# Patient Record
Sex: FEMALE | Race: WHITE | NOT HISPANIC OR LATINO | URBAN - METROPOLITAN AREA
[De-identification: names, ages, dates, MRNs, and addresses within clinical notes are randomized per-mention and may not be internally consistent; named-entity substitution may affect disease eponyms.]

---

## 2019-04-24 ASSESSMENT — ENCOUNTER SYMPTOMS
NAUSEA: 1
ABDOMINAL PAIN: 1
HOARSE VOICE: 0
CONSTIPATION: 0
SORE THROAT: 1
VOMITING: 0
NECK MASS: 0
HEARTBURN: 1
SINUS PAIN: 1
DIARRHEA: 0
BOWEL INCONTINENCE: 0
TROUBLE SWALLOWING: 0
RECTAL PAIN: 0
BLOOD IN STOOL: 0
TASTE DISTURBANCE: 0
SINUS CONGESTION: 1
JAUNDICE: 0
SMELL DISTURBANCE: 0
BLOATING: 0

## 2019-04-24 ASSESSMENT — ANXIETY QUESTIONNAIRES
7. FEELING AFRAID AS IF SOMETHING AWFUL MIGHT HAPPEN: NOT AT ALL
GAD7 TOTAL SCORE: 0
5. BEING SO RESTLESS THAT IT IS HARD TO SIT STILL: NOT AT ALL
4. TROUBLE RELAXING: NOT AT ALL
1. FEELING NERVOUS, ANXIOUS, OR ON EDGE: NOT AT ALL
2. NOT BEING ABLE TO STOP OR CONTROL WORRYING: NOT AT ALL
7. FEELING AFRAID AS IF SOMETHING AWFUL MIGHT HAPPEN: NOT AT ALL
3. WORRYING TOO MUCH ABOUT DIFFERENT THINGS: NOT AT ALL
GAD7 TOTAL SCORE: 0
6. BECOMING EASILY ANNOYED OR IRRITABLE: NOT AT ALL

## 2019-04-25 ASSESSMENT — ANXIETY QUESTIONNAIRES: GAD7 TOTAL SCORE: 0

## 2019-04-26 ENCOUNTER — OFFICE VISIT (OUTPATIENT)
Dept: OBGYN | Facility: CLINIC | Age: 24
End: 2019-04-26
Attending: ADVANCED PRACTICE MIDWIFE
Payer: COMMERCIAL

## 2019-04-26 VITALS
SYSTOLIC BLOOD PRESSURE: 131 MMHG | DIASTOLIC BLOOD PRESSURE: 73 MMHG | WEIGHT: 131.7 LBS | HEIGHT: 63 IN | BODY MASS INDEX: 23.34 KG/M2 | HEART RATE: 79 BPM

## 2019-04-26 DIAGNOSIS — Z30.41 ENCOUNTER FOR SURVEILLANCE OF CONTRACEPTIVE PILLS: ICD-10-CM

## 2019-04-26 DIAGNOSIS — Z01.419 ENCOUNTER FOR GYNECOLOGICAL EXAMINATION WITHOUT ABNORMAL FINDING: Primary | ICD-10-CM

## 2019-04-26 DIAGNOSIS — Z11.3 SCREENING EXAMINATION FOR VENEREAL DISEASE: ICD-10-CM

## 2019-04-26 PROCEDURE — G0145 SCR C/V CYTO,THINLAYER,RESCR: HCPCS | Performed by: ADVANCED PRACTICE MIDWIFE

## 2019-04-26 PROCEDURE — 87591 N.GONORRHOEAE DNA AMP PROB: CPT | Performed by: ADVANCED PRACTICE MIDWIFE

## 2019-04-26 PROCEDURE — 87491 CHLMYD TRACH DNA AMP PROBE: CPT | Performed by: ADVANCED PRACTICE MIDWIFE

## 2019-04-26 PROCEDURE — G0463 HOSPITAL OUTPT CLINIC VISIT: HCPCS | Mod: ZF

## 2019-04-26 RX ORDER — NORGESTIMATE AND ETHINYL ESTRADIOL 0.25-0.035
1 KIT ORAL DAILY
Qty: 84 TABLET | Refills: 3 | Status: SHIPPED | OUTPATIENT
Start: 2019-04-26 | End: 2020-03-25

## 2019-04-26 RX ORDER — NORGESTIMATE AND ETHINYL ESTRADIOL 0.25-0.035
KIT ORAL
COMMUNITY
Start: 2018-05-18 | End: 2022-01-20

## 2019-04-26 ASSESSMENT — ANXIETY QUESTIONNAIRES
1. FEELING NERVOUS, ANXIOUS, OR ON EDGE: NOT AT ALL
6. BECOMING EASILY ANNOYED OR IRRITABLE: NOT AT ALL
GAD7 TOTAL SCORE: 0
3. WORRYING TOO MUCH ABOUT DIFFERENT THINGS: NOT AT ALL
5. BEING SO RESTLESS THAT IT IS HARD TO SIT STILL: NOT AT ALL
2. NOT BEING ABLE TO STOP OR CONTROL WORRYING: NOT AT ALL
7. FEELING AFRAID AS IF SOMETHING AWFUL MIGHT HAPPEN: NOT AT ALL

## 2019-04-26 ASSESSMENT — MIFFLIN-ST. JEOR: SCORE: 1321.52

## 2019-04-26 ASSESSMENT — PATIENT HEALTH QUESTIONNAIRE - PHQ9: 5. POOR APPETITE OR OVEREATING: NOT AT ALL

## 2019-04-26 NOTE — LETTER
2019       RE: Selene Engle  [address]  [facility] [address]     Dear Colleague,    Thank you for referring your patient, Selene Engle, to the [facility] at [facility]. Please see a copy of my visit note below.    Subjective:  Selene Engle is a 24 year old female who presents today for initiation of care/annual preventative exam.  HPI:    - Feeling overall well, has no concerns. Has never had pap.    - Has been on oral contraceptive pills for several years, and overall very happy with them, has not considered an IUD in the past.  Desires refill prescription.   - Gets regular withdrawal bleeding which lasts 5 days and is lighter than periods prior to initiating birth control. She also uses condoms regularly with her boyfriend of 1.5 years. Has no plans to conceive in the near future.    - No concerns for STIs, denies vaginal discharge or discomfort, has been tested for gonorrhea/chlamydia in the past which was negative.  Agreeable to testing today.    Menstrual History:  OB History    Para Term  AB Living   0 0 0 0 0 0   SAB TAB Ectopic Multiple Live Births   0 0 0 0 0       Withdrawal bleeding is regular q 4 weeks, lasts 5 days, heaviest flow in days 1-3.  Dysmenorrhea none. Cyclic symptoms include  NONE. Additional symptoms include NONE  Social History:  Current contraception: oral contraceptives and condoms  Number of partners in last year: 1    Social History     Socioeconomic History     Marital status: Single     Spouse name: Not on file     Number of children: Not on file     Years of education: Not on file     Highest education level: Not on file   Occupational History     Not on file   Social Needs     Financial resource strain: Not on file     Food insecurity:     Worry: Not on file     Inability: Not on file     Transportation needs:     Medical: Not on file     Non-medical: Not on file   Tobacco Use     Smoking  status: Never Smoker     Smokeless tobacco: Never Used   Substance and Sexual Activity     Alcohol use: Yes     Comment: light use on weekends     Drug use: Never     Sexual activity: Yes     Partners: Male     Birth control/protection: Condom, Pill   Lifestyle     Physical activity:     Days per week: Not on file     Minutes per session: Not on file     Stress: Not on file   Relationships     Social connections:     Talks on phone: Not on file     Gets together: Not on file     Attends Buddhist service: Not on file     Active member of club or organization: Not on file     Attends meetings of clubs or organizations: Not on file     Relationship status: Not on file     Intimate partner violence:     Fear of current or ex partner: Not on file     Emotionally abused: Not on file     Physically abused: Not on file     Forced sexual activity: Not on file   Other Topics Concern     Not on file   Social History Narrative     Not on file     Past Screenings:  Health Maintenance   Topic Date Due     PREVENTIVE CARE VISIT  1996     CHLAMYDIA SCREENING  1996     DTAP/TDAP/TD IMMUNIZATION (1 - Tdap) 02/07/2003     HPV IMMUNIZATION (1 - Female 3-dose series) 02/07/2011     HIV SCREEN (SYSTEM ASSIGNED)  02/07/2014     PAP SCREENING Q3 YR (SYSTEM ASSIGNED)  02/07/2017     INFLUENZA VACCINE (1) 09/01/2018     ZOSTER IMMUNIZATION (1 of 2) 02/07/2046     PHQ-2  Completed     IPV IMMUNIZATION  Aged Out     MENINGITIS IMMUNIZATION  Aged Out     No results found for: PAP   History of abnormal Pap smear: No. Has never had pap.    Immunizations:    There is no immunization history on file for this patient.  History:  No Known Allergies  History reviewed. No pertinent family history.  History reviewed. No pertinent past medical history.  Past Surgical History:   Procedure Laterality Date     NO HISTORY OF SURGERY       ROS:  BREAST: NEGATIVE for masses, tenderness or discharge  CV: NEGATIVE for chest pain, palpitations   GI:  "NEGATIVE for nausea, abdominal pain, heartburn, or change in bowel habits  : NEGATIVE for frequency, dysuria, or hematuria   female: NEGATIVE for dysuria, Hx urinary tract infection, Hx STD's, irregular vaginal bleeding and vaginal discharge.  C: NEGATIVE for fever, chills  R: NEGATIVE for significant cough or SOB    Physical Exam:  /73 (BP Location: Left arm, Patient Position: Chair)   Pulse 79   Ht 1.6 m (5' 3\")   Wt 59.7 kg (131 lb 11.2 oz)   LMP 04/01/2019 (Exact Date)   Breastfeeding? No   BMI 23.33 kg/m     BMI: Body mass index is 23.33 kg/m .  Gen: Well appearing, well developed, NAD.  HEENT: Wearing glasses, good dentition, moist mucous membranes.  Neck: Normal mobile thyroid, no goiter.  Breast: No nipple retraction, or other overlying skin changes. No erythema or warmth. No palpable masses.  CV: RRR, no murmurs.  Resp: CTAB, good air entry bilaterally.  Abd: Normal bowel sounds, non-tender to palpation, no masses, no hepatomegaly.    PELVIC EXAM:  External Genitalia: WNL  Bartholin's/Urethral Meatus/[place]'s (BUS):  WNL/Negative  Bladder:  WNL  Vagina:  Normal mucosa and no discharge  Cervix:  healthy  Anus/Perineum: Normal    PAP smear obtained: YES  Gonorrhea and chlamydia screen obtained: YES      Assessment:  Annual gyn exam   Cervical cancer screening  Oral contraceptive surveillance    Plan:  Orders Placed This Encounter   Procedures     Obtaining, preparing and conveyance of cervical or vaginal smear to laboratory.     Pap imaged thin layer screen only - recommended age 21 - 24 years     - Additional teaching done at this visit included: self breast exam and birth control  - Rx sent for OCP refill  - Pap and GC/CT collected today  -  RTC in one year for annual exam or with concerns.    I, Leo Jimenez, am serving as a scribe to document services personally performed by CNM based on the provider's statements to me. - Leo Jimenez, MS3    The encounter was performed by me and scribed by " the SNM. The scribed note accurately reflects my personal services and decisions made by me. ELLEN Haines CNM    Again, thank you for allowing me to participate in the care of your patient.      Sincerely,    ELLEN Haines CNM

## 2019-04-26 NOTE — PROGRESS NOTES
Subjective:  Selene Engle is a 24 year old female who presents today for initiation of care/annual preventative exam.  HPI:    - Feeling overall well, has no concerns. Has never had pap.    - Has been on oral contraceptive pills for several years, and overall very happy with them, has not considered an IUD in the past.  Desires refill prescription.   - Gets regular withdrawal bleeding which lasts 5 days and is lighter than periods prior to initiating birth control. She also uses condoms regularly with her boyfriend of 1.5 years. Has no plans to conceive in the near future.    - No concerns for STIs, denies vaginal discharge or discomfort, has been tested for gonorrhea/chlamydia in the past which was negative.  Agreeable to testing today.    Menstrual History:  OB History    Para Term  AB Living   0 0 0 0 0 0   SAB TAB Ectopic Multiple Live Births   0 0 0 0 0       Withdrawal bleeding is regular q 4 weeks, lasts 5 days, heaviest flow in days 1-3.  Dysmenorrhea none. Cyclic symptoms include  NONE. Additional symptoms include NONE  Social History:  Current contraception: oral contraceptives and condoms  Number of partners in last year: 1    Social History     Socioeconomic History     Marital status: Single     Spouse name: Not on file     Number of children: Not on file     Years of education: Not on file     Highest education level: Not on file   Occupational History     Not on file   Social Needs     Financial resource strain: Not on file     Food insecurity:     Worry: Not on file     Inability: Not on file     Transportation needs:     Medical: Not on file     Non-medical: Not on file   Tobacco Use     Smoking status: Never Smoker     Smokeless tobacco: Never Used   Substance and Sexual Activity     Alcohol use: Yes     Comment: light use on weekends     Drug use: Never     Sexual activity: Yes     Partners: Male     Birth control/protection: Condom, Pill   Lifestyle     Physical activity:     Days  per week: Not on file     Minutes per session: Not on file     Stress: Not on file   Relationships     Social connections:     Talks on phone: Not on file     Gets together: Not on file     Attends Caodaism service: Not on file     Active member of club or organization: Not on file     Attends meetings of clubs or organizations: Not on file     Relationship status: Not on file     Intimate partner violence:     Fear of current or ex partner: Not on file     Emotionally abused: Not on file     Physically abused: Not on file     Forced sexual activity: Not on file   Other Topics Concern     Not on file   Social History Narrative     Not on file     Past Screenings:  Health Maintenance   Topic Date Due     PREVENTIVE CARE VISIT  1996     CHLAMYDIA SCREENING  1996     DTAP/TDAP/TD IMMUNIZATION (1 - Tdap) 02/07/2003     HPV IMMUNIZATION (1 - Female 3-dose series) 02/07/2011     HIV SCREEN (SYSTEM ASSIGNED)  02/07/2014     PAP SCREENING Q3 YR (SYSTEM ASSIGNED)  02/07/2017     INFLUENZA VACCINE (1) 09/01/2018     ZOSTER IMMUNIZATION (1 of 2) 02/07/2046     PHQ-2  Completed     IPV IMMUNIZATION  Aged Out     MENINGITIS IMMUNIZATION  Aged Out     No results found for: PAP   History of abnormal Pap smear: No. Has never had pap.    Immunizations:    There is no immunization history on file for this patient.  History:  No Known Allergies  History reviewed. No pertinent family history.  History reviewed. No pertinent past medical history.  Past Surgical History:   Procedure Laterality Date     NO HISTORY OF SURGERY       ROS:  BREAST: NEGATIVE for masses, tenderness or discharge  CV: NEGATIVE for chest pain, palpitations   GI: NEGATIVE for nausea, abdominal pain, heartburn, or change in bowel habits  : NEGATIVE for frequency, dysuria, or hematuria   female: NEGATIVE for dysuria, Hx urinary tract infection, Hx STD's, irregular vaginal bleeding and vaginal discharge.  C: NEGATIVE for fever, chills  R: NEGATIVE for  "significant cough or SOB    Physical Exam:  /73 (BP Location: Left arm, Patient Position: Chair)   Pulse 79   Ht 1.6 m (5' 3\")   Wt 59.7 kg (131 lb 11.2 oz)   LMP 04/01/2019 (Exact Date)   Breastfeeding? No   BMI 23.33 kg/m    BMI: Body mass index is 23.33 kg/m .  Gen: Well appearing, well developed, NAD.  HEENT: Wearing glasses, good dentition, moist mucous membranes.  Neck: Normal mobile thyroid, no goiter.  Breast: No nipple retraction, or other overlying skin changes. No erythema or warmth. No palpable masses.  CV: RRR, no murmurs.  Resp: CTAB, good air entry bilaterally.  Abd: Normal bowel sounds, non-tender to palpation, no masses, no hepatomegaly.    PELVIC EXAM:  External Genitalia: WNL  Bartholin's/Urethral Meatus/[place]'s (BUS):  WNL/Negative  Bladder:  WNL  Vagina:  Normal mucosa and no discharge  Cervix:  healthy  Anus/Perineum: Normal    PAP smear obtained: YES  Gonorrhea and chlamydia screen obtained: YES      Assessment:  Annual gyn exam   Cervical cancer screening  Oral contraceptive surveillance    Plan:  Orders Placed This Encounter   Procedures     Obtaining, preparing and conveyance of cervical or vaginal smear to laboratory.     Pap imaged thin layer screen only - recommended age 21 - 24 years     - Additional teaching done at this visit included: self breast exam and birth control  - Rx sent for OCP refill  - Pap and GC/CT collected today  -  RTC in one year for annual exam or with concerns.    I, Leo Jimenez, am serving as a scribe to document services personally performed by CNM based on the provider's statements to me. - Leo Jimenez, MS3    The encounter was performed by me and scribed by the SNM. The scribed note accurately reflects my personal services and decisions made by me. ELLEN Haines CNM  "

## 2019-04-26 NOTE — LETTER
Date:May 6, 2019      Patient was self referred, no letter generated. Do not send.        [facility] Information

## 2019-04-28 LAB
C TRACH DNA SPEC QL NAA+PROBE: NEGATIVE
N GONORRHOEA DNA SPEC QL NAA+PROBE: NEGATIVE
SPECIMEN SOURCE: NORMAL
SPECIMEN SOURCE: NORMAL

## 2019-04-30 LAB
COPATH REPORT: NORMAL
PAP: NORMAL

## 2020-03-11 ENCOUNTER — HEALTH MAINTENANCE LETTER (OUTPATIENT)
Age: 25
End: 2020-03-11

## 2020-03-24 ENCOUNTER — MYC MEDICAL ADVICE (OUTPATIENT)
Dept: OBGYN | Facility: CLINIC | Age: 25
End: 2020-03-24

## 2020-03-24 DIAGNOSIS — Z30.41 ENCOUNTER FOR SURVEILLANCE OF CONTRACEPTIVE PILLS: ICD-10-CM

## 2020-03-25 RX ORDER — NORGESTIMATE AND ETHINYL ESTRADIOL 0.25-0.035
1 KIT ORAL DAILY
Qty: 84 TABLET | Refills: 3 | Status: SHIPPED | OUTPATIENT
Start: 2020-03-25 | End: 2021-02-01

## 2021-01-03 ENCOUNTER — HEALTH MAINTENANCE LETTER (OUTPATIENT)
Age: 26
End: 2021-01-03

## 2021-01-23 ENCOUNTER — OFFICE VISIT (OUTPATIENT)
Dept: ORTHOPEDICS | Facility: CLINIC | Age: 26
End: 2021-01-23
Payer: COMMERCIAL

## 2021-01-23 VITALS — BODY MASS INDEX: 22.36 KG/M2 | WEIGHT: 131 LBS | HEIGHT: 64 IN

## 2021-01-23 DIAGNOSIS — M89.8X1 PERISCAPULAR PAIN: Primary | ICD-10-CM

## 2021-01-23 PROCEDURE — 99203 OFFICE O/P NEW LOW 30 MIN: CPT | Performed by: FAMILY MEDICINE

## 2021-01-23 ASSESSMENT — MIFFLIN-ST. JEOR: SCORE: 1329.21

## 2021-01-23 NOTE — PROGRESS NOTES
"      SPORTS & ORTHOPEDIC WALK-IN VISIT 1/23/2021    Primary Care Physician: Dr. Vega Ref-Primary, Physician    Here today with right posterior shoulder pain.  Began having some localized pain in the periscapular area before edwin after working and then sitting in a car for a long period of time.  And she went skiiing after edwin and had more pain and in a more larger area. Pain is the most when laying on back, when waking up it is super painful and stiff.  She does not have any popping clicking or catching.  Has not had any tingling or numbness.  Has not noted any weakness.    Reason for visit:     What part of your body is injured / painful?  right shoulder    What caused the injury /pain? No inciting event     How long ago did your injury occur or pain begin? 12/18/20    What are your most bothersome symptoms? Pain    How would you characterize your symptom?  aching and sharp    What makes your symptoms better? Ice    What makes your symptoms worse? Other: laying     Have you been previously seen for this problem? No    Medical History:    Any recent changes to your medical history? No    Any new medication prescribed since last visit? No    Have you had surgery on this body part before? No    Social History:    Occupation: Student     Handedness: Right    Exercise: 4-5 days/week    Review of Systems:    Do you have fever, chills, weight loss? No    Do you have any vision problems? No    Do you have any chest pain or edema? No    Do you have any shortness of breath or wheezing?  No    Do you have stomach problems? No    Do you have any numbness or focal weakness? No    Do you have diabetes? No    Do you have problems with bleeding or clotting? No    Do you have an rashes or other skin lesions? No         Past Medical History, Current Medications, and Allergies are reviewed in the electronic medical record as appropriate.       EXAM:Ht 1.626 m (5' 4\")   Wt 59.4 kg (131 lb)   BMI 22.49 kg/m  "     EXAM:  Alert, pleasant and conversational    Neck with full AROM, non-tender to midline cervical and upper thoracic spine palpation, negative Spurling's.  Elbow with FROM and non-tender to palpation      right shoulder:   Skin intact. No skin changes, deformity or atrophy    AROM:   Forward Flexion: 180    Abduction: 180    External rotation: ~70    Internal rotation: T7.   symmetric ROM compared to uninjured side  symmetric Scapular motion    Strength testing:   Abduction: 5/5,   External rotation: 5/5   Internal rotation 5/5     Deltoids 5/5, Biceps 5/5, Triceps 5/5,  5/5.    Palpation: negative TTP of the Acromioclavicular joint  negative TTP of Sternoclavicular joint  negative TTP of posterior glenoid  negative TTP of scapular borders  negative TTP of the bicipital tendon.     Special Tests: negative Johnson test  negative Neer's test.   negativeO'Tyson's test   negative Speed's test.    Neurovascularly intact bilateral upper extremities.      Imaging: no imaging this visit      Assessment: Patient is a 25 year old female with right periscapular pain.     Recommendations:   Reviewed imaging and assessment with the patient in detail  Provided with home exercises and recommended follow-up physical therapy  Discussed continuing activity as tolerated  Also discussed briefly modifying sleep positioning particularly extra support for the neck to see if this does not improve her nighttime pain  Also discussed the role of heat and ice as well as foam rolling and trigger point massage.  Follow up joe Ha MD

## 2021-01-23 NOTE — LETTER
1/23/2021         RE: Selene Engle  [address]  [facility] [address]        Dear Colleague,    Thank you for referring your patient, Selene Engle, to the [facility] [place]. Please see a copy of my visit note below.          SPORTS & ORTHOPEDIC WALK-IN VISIT 1/23/2021    Primary Care Physician: Dr. Vega Ref-Primary, Physician    Here today with right posterior shoulder pain.  Began having some localized pain in the periscapular area before edwin after working and then sitting in a car for a long period of time.  And she went skiiing after edwin and had more pain and in a more larger area. Pain is the most when laying on back, when waking up it is super painful and stiff.  She does not have any popping clicking or catching.  Has not had any tingling or numbness.  Has not noted any weakness.    Reason for visit:     What part of your body is injured / painful?  right shoulder    What caused the injury /pain? No inciting event     How long ago did your injury occur or pain begin? 12/18/20    What are your most bothersome symptoms? Pain    How would you characterize your symptom?  aching and sharp    What makes your symptoms better? Ice    What makes your symptoms worse? Other: laying     Have you been previously seen for this problem? No    Medical History:    Any recent changes to your medical history? No    Any new medication prescribed since last visit? No    Have you had surgery on this body part before? No    Social History:    Occupation: Student     Handedness: Right    Exercise: 4-5 days/week    Review of Systems:    Do you have fever, chills, weight loss? No    Do you have any vision problems? No    Do you have any chest pain or edema? No    Do you have any shortness of breath or wheezing?  No    Do you have stomach problems? No    Do you have any numbness or focal weakness? No    Do you have diabetes? No    Do you have problems with bleeding or clotting?  "No    Do you have an rashes or other skin lesions? No         Past Medical History, Current Medications, and Allergies are reviewed in the electronic medical record as appropriate.       EXAM:Ht 1.626 m (5' 4\")   Wt 59.4 kg (131 lb)   BMI 22.49 kg/m      EXAM:  Alert, pleasant and conversational    Neck with full AROM, non-tender to midline cervical and upper thoracic spine palpation, negative Spurling's.  Elbow with FROM and non-tender to palpation      right shoulder:   Skin intact. No skin changes, deformity or atrophy    AROM:   Forward Flexion: 180    Abduction: 180    External rotation: ~70    Internal rotation: T7.   symmetric ROM compared to uninjured side  symmetric Scapular motion    Strength testing:   Abduction: 5/5,   External rotation: 5/5   Internal rotation 5/5     Deltoids 5/5, Biceps 5/5, Triceps 5/5,  5/5.    Palpation: negative TTP of the Acromioclavicular joint  negative TTP of Sternoclavicular joint  negative TTP of posterior glenoid  negative TTP of scapular borders  negative TTP of the bicipital tendon.     Special Tests: negative Johnson test  negative Neer's test.   negativeO'Tyson's test   negative Speed's test.    Neurovascularly intact bilateral upper extremities.      Imaging: no imaging this visit      Assessment: Patient is a 25 year old female with right periscapular pain.     Recommendations:   Reviewed imaging and assessment with the patient in detail  Provided with home exercises and recommended follow-up physical therapy  Discussed continuing activity as tolerated  Also discussed briefly modifying sleep positioning particularly extra support for the neck to see if this does not improve her nighttime pain  Also discussed the role of heat and ice as well as foam rolling and trigger point massage.  Follow up joe Ha MD      "

## 2021-01-29 ENCOUNTER — MYC MEDICAL ADVICE (OUTPATIENT)
Dept: OBGYN | Facility: CLINIC | Age: 26
End: 2021-01-29

## 2021-01-29 DIAGNOSIS — Z30.41 ENCOUNTER FOR SURVEILLANCE OF CONTRACEPTIVE PILLS: ICD-10-CM

## 2021-02-01 RX ORDER — NORGESTIMATE AND ETHINYL ESTRADIOL 0.25-0.035
1 KIT ORAL DAILY
Qty: 84 TABLET | Refills: 3 | Status: SHIPPED | OUTPATIENT
Start: 2021-02-01 | End: 2022-01-20

## 2021-02-01 NOTE — TELEPHONE ENCOUNTER
Received refill request for OCP.  Patient is due for annual. Pap done in 2019.     Able to refill OCP per protocol. Reminder for annual sent through [employer].

## 2021-02-23 DIAGNOSIS — Z30.41 ENCOUNTER FOR SURVEILLANCE OF CONTRACEPTIVE PILLS: Primary | ICD-10-CM

## 2021-02-23 RX ORDER — NORGESTIMATE AND ETHINYL ESTRADIOL 0.25-0.035
KIT ORAL
Qty: 28 TABLET | Refills: 26 | Status: SHIPPED | OUTPATIENT
Start: 2021-02-23

## 2021-04-06 ENCOUNTER — IMMUNIZATION (OUTPATIENT)
Dept: NURSING | Facility: CLINIC | Age: 26
End: 2021-04-06
Payer: COMMERCIAL

## 2021-04-06 PROCEDURE — 0001A PR COVID VAC PFIZER DIL RECON 30 MCG/0.3 ML IM: CPT

## 2021-04-06 PROCEDURE — 91300 PR COVID VAC PFIZER DIL RECON 30 MCG/0.3 ML IM: CPT

## 2021-04-28 ENCOUNTER — IMMUNIZATION (OUTPATIENT)
Dept: NURSING | Facility: CLINIC | Age: 26
End: 2021-04-28
Attending: INTERNAL MEDICINE
Payer: COMMERCIAL

## 2021-04-28 PROCEDURE — 91300 PR COVID VAC PFIZER DIL RECON 30 MCG/0.3 ML IM: CPT

## 2021-04-28 PROCEDURE — 0002A PR COVID VAC PFIZER DIL RECON 30 MCG/0.3 ML IM: CPT

## 2022-01-20 ENCOUNTER — OFFICE VISIT (OUTPATIENT)
Dept: OBGYN | Facility: CLINIC | Age: 27
End: 2022-01-20
Payer: COMMERCIAL

## 2022-01-20 VITALS
SYSTOLIC BLOOD PRESSURE: 114 MMHG | HEIGHT: 63 IN | DIASTOLIC BLOOD PRESSURE: 72 MMHG | OXYGEN SATURATION: 97 % | BODY MASS INDEX: 28.17 KG/M2 | WEIGHT: 159 LBS | HEART RATE: 86 BPM

## 2022-01-20 DIAGNOSIS — Z12.4 SCREENING FOR MALIGNANT NEOPLASM OF CERVIX: ICD-10-CM

## 2022-01-20 DIAGNOSIS — Z30.09 COUNSELING FOR BIRTH CONTROL REGARDING INTRAUTERINE DEVICE (IUD): ICD-10-CM

## 2022-01-20 DIAGNOSIS — N94.6 DYSMENORRHEA: ICD-10-CM

## 2022-01-20 DIAGNOSIS — Z30.41 ORAL CONTRACEPTIVE PILL SURVEILLANCE: ICD-10-CM

## 2022-01-20 DIAGNOSIS — Z01.419 ENCOUNTER FOR GYNECOLOGICAL EXAMINATION WITHOUT ABNORMAL FINDING: Primary | ICD-10-CM

## 2022-01-20 PROCEDURE — 99212 OFFICE O/P EST SF 10 MIN: CPT | Mod: 25 | Performed by: OBSTETRICS & GYNECOLOGY

## 2022-01-20 PROCEDURE — G0145 SCR C/V CYTO,THINLAYER,RESCR: HCPCS | Performed by: OBSTETRICS & GYNECOLOGY

## 2022-01-20 PROCEDURE — 99385 PREV VISIT NEW AGE 18-39: CPT | Performed by: OBSTETRICS & GYNECOLOGY

## 2022-01-20 RX ORDER — NORGESTIMATE AND ETHINYL ESTRADIOL 0.25-0.035
1 KIT ORAL DAILY
Qty: 84 TABLET | Refills: 3 | Status: SHIPPED | OUTPATIENT
Start: 2022-01-20 | End: 2023-02-09

## 2022-01-20 ASSESSMENT — MIFFLIN-ST. JEOR: SCORE: 1435.35

## 2022-01-20 NOTE — PROGRESS NOTES
GYN CLINIC VISIT  2022  CC: annual exam    HPI:  Selene is a 26 year old  female who presents for annual exam.     Menses are regular q 28-30 days and normal lasting 5-7 days.  Menses flow: normal.  Patient's last menstrual period was 2022.. Using oral contraceptives and condoms for contraception.  She is not currently considering pregnancy. Menses are less heavy with OCPs.   Besides routine health maintenance,  she would like to discuss iud options.  Dysmenorrhea - 1 day (2nd day of flow) the cramps are really bad. Needs a heating pad and ibuprofen 400-600mg 1-2 times per day. Sometimes so bad unable to get out of bed. Taking OCPs.   Questions about IUD.   GYNECOLOGIC HISTORY:  Menarche:  Selene is sexually active with 1male partner(s) and is currently in monogamous relationship.    History sexually transmitted infections:No STD history  STI testing offered?  Declined  NGUYỄN exposure: Unknown  History of abnormal Pap smear: NO - age 21-29 PAP every 3 years recommended  Family history of breast CA: No  Family history of uterine/ovarian CA: No    Family history of colon CA: No    HEALTH MAINTENANCE:  Cholesterol: (No results found for: CHOL History of abnormal lipids: No  Mammo: na . History of abnormal Mammo: Not applicable.  Regular Self Breast Exams: Yes  Calcium/Vitamin D intake: source:  dairy Adequate? Yes  TSH: (No results found for: TSH )  Pap; (  Lab Results   Component Value Date    PAP NIL 2019    )    HISTORY:  OB History    Para Term  AB Living   0 0 0 0 0 0   SAB IAB Ectopic Multiple Live Births   0 0 0 0 0     No past medical history on file.     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  Lives with partner, feels safe  No tobacco, drinks 2/wk, no drug use  Weight lifting for exercise  Socioeconomic History     Marital status: Single     Spouse name: Not on file     Number of children: Not on file     Years of education: Not on file     Highest education level: Not on file   Occupational History     Not on file   Tobacco Use     Smoking status: Never Smoker     Smokeless tobacco: Never Used   Substance and Sexual Activity     Alcohol use: Yes     Comment: light use on weekends     Drug use: Never     Sexual activity: Yes     Partners: Male     Birth control/protection: Condom, Pill   Other Topics Concern     Not on file   Social History Narrative     Not on file     Social Determinants of Health     Financial Resource Strain: Not on file   Food Insecurity: Not on file   Transportation Needs: Not on file   Physical Activity: Not on file   Stress: Not on file   Social Connections: Not on file   Intimate Partner Violence: Not on file   Housing Stability: Not on file       Current Outpatient Medications:      FLORENTINO 0.25-35 MG-MCG tablet, TAKE 1 TABLET BY MOUTH EVERY DAY, Disp: 28 tablet, Rfl: 26   No Known Allergies    Past medical, surgical, social and family history were reviewed and updated in EPIC.    ROS:   C:     NEGATIVE for fever, chills, change in weight  I:       NEGATIVE for worrisome rashes, moles or lesions  E:     NEGATIVE for vision changes or irritation  E/M: NEGATIVE for ear, mouth and throat problems  R:     NEGATIVE for significant cough or SOB  CV:   NEGATIVE for chest pain, palpitations or peripheral edema  GI:     NEGATIVE for nausea, abdominal pain, heartburn, or change in bowel habits  :   NEGATIVE for frequency, dysuria, hematuria, vaginal discharge, or irregular bleeding  M:     NEGATIVE for significant  "arthralgias or myalgia  N:      NEGATIVE for weakness, dizziness or paresthesias  E:      NEGATIVE for temperature intolerance, skin/hair changes  P:      NEGATIVE for changes in mood or affect.    EXAM:  /72   Pulse 86   Ht 1.6 m (5' 3\")   Wt 72.1 kg (159 lb)   LMP 01/09/2022   SpO2 97%   BMI 28.17 kg/m     BMI: Body mass index is 28.17 kg/m .  Constitutional: healthy, alert and no distress  Head: Normocephalic. No masses, lesions, tenderness or abnormalities  Neck: Neck supple. Trachea midline. No adenopathy. Thyroid symmetric, normal size.   Cardiovascular: RRR.   Respiratory: clear bilaterally.  Breast: No nodularity, asymmetry or nipple discharge bilaterally.  Gastrointestinal: Abdomen soft, non-tender, non-distended. No masses, organomegaly.  :  Vulva:  No external lesions, normal female hair distribution, no inguinal adenopathy.    Urethra:  Midline, non-tender, well supported, no discharge  Vagina:  Moist, pink, no abnormal discharge, no lesions  Cervix: Long juanito speculum needed to view cervix. Appears nulliparous and normal, no lesions or abnormal discharge.  Uterus:  Normal size, anteverted , non-tender, freely mobile  Ovaries:  No masses appreciated, non-tender, mobile  Rectal Exam: deferred  Musculoskeletal: extremities normal  Skin: no suspicious lesions or rashes  Psychiatric: Affect appropriate, cooperative,mentation appears normal.     COUNSELING:   Reviewed preventive health counseling, as reflected in patient instructions       Regular exercise       Healthy diet/nutrition       Contraception       Safe sex practices/STD prevention   reports that she has never smoked. She has never used smokeless tobacco.    Body mass index is 28.17 kg/m .    FRAX Risk Assessment    ASSESSMENT:  26 year old female with satisfactory annual exam    1. Encounter for gynecological examination without abnormal finding      2. Oral contraceptive pill surveillance  - norgestimate-ethinyl estradiol " (ORTHO-CYCLEN, 28,) 0.25-35 MG-MCG tablet; Take 1 tablet by mouth daily  Dispense: 84 tablet; Refill: 3    3. Screening for malignant neoplasm of cervix  Follow up per ASCCP guidelines.  - Pap screen reflex to HPV if ASCUS - recommend age 25 - 29    4. Dysmenorrhea  Recommend ibuprofen 600mg q6h starting on first day of period (second day is the worse). Discussed continuous OCPs, discussed IUD.    5. Counseling for birth control regarding intrauterine device (IUD)  Discussed that intrauterine device is a form of long acting reversible contraception. Reviewed types of IUDs - copper and levonorgestrel containing - their respective mechanisms of action, bleeding profiles and duration. Discussed that copper IUD is effective immediately and can be used as emergency contraception. Discussed that with levonorgestrel IUD that ovarian cysts may occur but usually disappear.Discussed insertion process and what to expect immediately after. Reviewed contraindications to IUD including pregnancy, uterine anomaly, infection, known or suspected breast cancer or other progestin-sensitive cancer, liver disease, allergy. She was counseled on risks of infection, bleeding, uterine perforation, cervical laceration, expulsion. Reviewed that overall risk of pregnancy 2 in 1000, if she does get pregnant that there is an increased risk of ectopic pregnancy. Recommend patient come in during first 7 days of her menstrual cycle or use a reliable method of contraception for at least 2 weeks prior to insertion. Recommend patient take ibuprofen prior to insertion.     I recommend Mirena IUD given her h/o menorrhagia and current dysmenorrhea.     Nuria Chin MD

## 2022-01-24 LAB
BKR LAB AP GYN ADEQUACY: NORMAL
BKR LAB AP GYN INTERPRETATION: NORMAL
BKR LAB AP HPV REFLEX: NORMAL
BKR LAB AP LMP: NORMAL
BKR LAB AP PREVIOUS ABNORMAL: NORMAL
PATH REPORT.COMMENTS IMP SPEC: NORMAL
PATH REPORT.COMMENTS IMP SPEC: NORMAL
PATH REPORT.RELEVANT HX SPEC: NORMAL

## 2022-09-18 ENCOUNTER — HEALTH MAINTENANCE LETTER (OUTPATIENT)
Age: 27
End: 2022-09-18

## 2023-02-08 ENCOUNTER — MYC MEDICAL ADVICE (OUTPATIENT)
Dept: OBGYN | Facility: CLINIC | Age: 28
End: 2023-02-08

## 2023-02-08 DIAGNOSIS — Z30.41 ORAL CONTRACEPTIVE PILL SURVEILLANCE: ICD-10-CM

## 2023-02-08 DIAGNOSIS — Z30.41 ENCOUNTER FOR SURVEILLANCE OF CONTRACEPTIVE PILLS: ICD-10-CM

## 2023-02-08 RX ORDER — NORGESTIMATE AND ETHINYL ESTRADIOL 0.25-0.035
1 KIT ORAL DAILY
Qty: 28 TABLET | Refills: 26 | OUTPATIENT
Start: 2023-02-08

## 2023-02-09 RX ORDER — NORGESTIMATE AND ETHINYL ESTRADIOL 0.25-0.035
1 KIT ORAL DAILY
Qty: 84 TABLET | Refills: 0 | Status: SHIPPED | OUTPATIENT
Start: 2023-02-09 | End: 2023-04-11

## 2023-02-09 NOTE — TELEPHONE ENCOUNTER
Requested Prescriptions   Pending Prescriptions Disp Refills     norgestimate-ethinyl estradiol (ORTHO-CYCLEN (28)) 0.25-35 MG-MCG tablet 84 tablet 0     Sig: Take 1 tablet by mouth daily       There is no refill protocol information for this order        Last Written Prescription Date:  1/20/22  Last Fill Quantity: 84,  # refills: 3   Last office visit: 1/20/22 with prescribing provider:  Dr. Elsy Thomas message sent to schedule annual.    Future Office Visit:    None    Medication is being filled for 1 time refill only due to:  Patient needs to be seen because it has been more than one year since last visit.   Alessandra Sanon RN on 2/9/2023 at 9:36 AM

## 2023-02-13 RX ORDER — NORGESTIMATE AND ETHINYL ESTRADIOL 0.25-0.035
1 KIT ORAL DAILY
Qty: 28 TABLET | Refills: 26 | OUTPATIENT
Start: 2023-02-13

## 2023-04-08 NOTE — PATIENT INSTRUCTIONS
PREVENTIVE HEALTH RECOMMENDATIONS:   Most women need a yearly breast and pelvic exam.    A PAP screen, a test done DURING a pelvic exam, is NO longer recommended yearly.    March 2013, screening guidelines recommended by ACOG for PAP screen are:    1) First pap at age 21.    2) Pap every 3 years until age 30.    3) After age 30, pap every 3 years or Pap with HR HPV screen every 5 years until age 65.  4) Women do NOT need a vaginal Pap screen after a hysterectomy (surgical removal of the uterus) when they have not had cancer.    Exceptions:  1) Yearly pap if HIV+ or immunosuppressed secondary to organ transplant  2) LINDA II-III continue routine screening for 20 years.    I encourage you continue looking for opportunities to choose a healthy lifestyle:       * Choose to eat a heart healthy diet. Check out the FOOD PLATE guidelines at: http://www.choosemyplate.gov/ for helpful hints on weight and cholesterol management.  Balance your caloric intake with exercise to maintain a BMI in the 22 to 26 range. For bone health: Eat calcium-rich foods like yogurt, broccoli or take chewable calcium pills (500 to 600 mg) twice a day with food.       * Exercise for at least an average of 30 minutes a day, 5 days of the week. This will help you control your weight, release stress, and help prevent disease.      * Take a Vitamin D3 supplement daily fall through spring and during summer unless you [id number]' full body sun exposure to skin without sunscreen.      * DO wear sunscreen to prevent skin cancer after the first 15-30 minutes.      * Identify stressors in your life, find ways to release the stress, and, make time for yourself. PLEASE ask for help if mood changes last longer than two weeks.     * Limit alcohol to one drink per day.  No smoking.  Avoid second hand smoke. If you smoke, ask for help to stop.       *  If you are in a sexual relationship, talk with your partner about possible infection risks and take action to  protect yourself from exposure to a sexual infection.    Please request an infection screen for STIs (sexually transmitted infections) if you are less than age 26 OR believe that you may be at risk.     Get a flu shot each year. Get a tetanus shot every 10 years. EVERYONE needs a pertussis (Whooping cough) booster.    See your dentist twice a year for an exam and preventive care cleaning.     Consider the following screen tests:    1) cholesterol test every 5 years.     2) yearly mammogram after age 40 unless you have identified risks.    3) colonoscopy every 10 years after age 50 unless you have identified risks.    4) diabetes blood test screening if you are at risk for diabetes.      Additional information that you may also find helpful:  The Internet now gives us access to LOTS of information -- some of it helpful, research documented and also plenty of harmful, anecdotal information that may not pertain to your situtaion. Consider visiting the following websites for accurate health information:    www.vitamindcouncil.org/ : Info and ongoing research re Vitamin D    www.APERA BAGS.org : Up to date and easily searchable information on multiple topics.    www.medlineplus.gov : medication info, interactive tutorials, watch real surgeries online    www.cdc.gov : public health info, travel advisories, epidemics (H1N1)    www.axel/std.org: current research re diagnosis, treatment and prevention of sexually contacted infections.    www.health.Count includes the [facility].[place].us : [place] dept of heatl, public health issues in [place], N1N1    www.familydoctor.org : good info from the Academy of Family Physicians      Thank you for trusting us with your care!     If you need to contact us for questions about:  Symptoms, Scheduling & Medical Questions; Non-urgent (2-3 day response) William message, Urgent (needing response today) [phone] (if after 3:30pm next day response)   Prescriptions: Please call your Pharmacy   Billing: Manan [phone] or  [facility]:[phone]

## 2023-04-08 NOTE — PROGRESS NOTES
"Progress Note    SUBJECTIVE:  Selene Engle is an 28 year old, , who requests an Annual Preventive Exam.     Concerns today include: Curious about different types of birth control including IUD.      Diet: feels like she eats balanced diet.  Fruits and vegetables every meal.     Exercise: Walk everyday.  Weight lift 2-3 times per week.     Sleep: \"sleeps ok\".  Sometime having trouble falling asleep.  Sleep hygiene, \"not good about putting her phone away\", started reading before bed on dark mode.     Stressors: Currently in grad school; getting a PHD in plant biology. 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 Along with planning a wedding. Seeing a therapist for weekly therapy.     Sexual history: Fiance; been together 5 years; male partner; monogamous relationship.                             Denies concerns for STI's.     Menstrual cycle:  Experiences cramping the first couple days.  Tries to skip her period by not taking the placebo pills she is still getting a period. When period begins she stopped taking her pills and allows for a withdrawal bleed.   Cycle length: 21-22 days   Lasts: 5-7 days    Denies HTN, blood clots, Migraine with aura, and breast cancer both personal and family history.    Menstrual History:      2019     9:37 AM 2022     9:30 AM 2023     2:00 PM   Menstrual History   LAST MENSTRUAL PERIOD  2022   Menarche Age 12 years     Period Cycle (Days) 5     Method of Contraception Combined oral contraceptive     Period Pattern Regular     Menstrual Flow Moderate     Menstrual Control Other (Comment)     Dysmenorrhea Moderate     PMS Symptoms Cramping     Reviewed Today Yes     Comments diva cup         Last    Lab Results   Component Value Date    PAP NIL 2022     History of abnormal Pap smear: NO - age 21-29 PAP every 3 years recommended    Mammogram current: not applicable; will initiate screening at age 40  Last Mammogram: No past " results    Last Colonoscopy:not applicable - will initiate at age 45  No results found for this or any previous visit.      HISTORY:  FLORENTINO 0.25-35 MG-MCG tablet, TAKE 1 TABLET BY MOUTH EVERY DAY    No current facility-administered medications on file prior to visit.    Allergies   Allergen Reactions     No Known Allergies      Immunization History   Administered Date(s) Administered     COVID-19 Vaccine 12+ (Pfizer) 2021, 2021, 2021     COVID-19 Vaccine Bivalent Booster 12+ (Pfizer) 10/19/2022     DTAP (<7y) 2001     DTP-Hib 1996, 1996, 1996, 1997     Flu, Unspecified 2021     HPV Quadrivalent 2008, 2008, 2008     HepA, Unspecified 2010, 2011     HepB, Unspecified 1996, 1996, 1996     Influenza Vaccine >6 months (Alfuria,Fluzone) 10/08/2020, 2021, 2022     MMR 1997, 2001     Meningococcal (Menomune ) 2009     Meningococcal,unspecified 2009     Polio, Unspecified  1996, 1996, 1996, 1997     Poliovirus, inactivated (IPV) 2001     TDAP (Adacel,Boostrix) 2009, 2022     Typhoid IM 2022     Typhoid Oral 2016     Varicella 1999, 2011     Yellow Fever 2016       OB History    Para Term  AB Living   0 0 0 0 0 0   SAB IAB Ectopic Multiple Live Births   0 0 0 0 0     History reviewed. No pertinent past medical history.  Past Surgical History:   Procedure Laterality Date     NO HISTORY OF SURGERY       Family History   Problem Relation Age of Onset     No Known Problems Mother      No Known Problems Father      Liver Disease Maternal Grandmother      No Known Problems Maternal Grandfather      No Known Problems Paternal Grandmother      Lupus Paternal Grandfather      No Known Problems Brother      No Known Problems Sister      Social History     Socioeconomic History     Marital status: Single   Tobacco Use  "    Smoking status: Never     Smokeless tobacco: Never   Substance and Sexual Activity     Alcohol use: Yes     Comment: light use on weekends     Drug use: Never     Sexual activity: Yes     Partners: Male     Birth control/protection: Condom, Pill       ROS   ROS: 10 point ROS neg other than the symptoms noted above in the HPI.        4/24/2019    10:26 PM 4/26/2019     9:37 AM 4/11/2023     2:42 PM   KYLEE-7 SCORE   Total Score 0 (minimal anxiety)     Total Score 0 0 5     EXAM:  Blood pressure 129/83, pulse 90, height 1.6 m (5' 3\"), weight 79.3 kg (174 lb 12.8 oz), last menstrual period 04/04/2023, not currently breastfeeding. Body mass index is 30.96 kg/m .  General - pleasant female in no acute distress.  Skin - no suspicious lesions or rashes  EENT-  PERRLA, euthyroid with out palpable nodules  Neck - supple without lymphadenopathy.  Lungs - clear to auscultation bilaterally.  Heart - regular rate and rhythm without murmur.  Musculoskeletal - no gross deformities.  Neurological - normal strength, sensation, and mental status.    Breast Exam:  Breast:Deferred     Pelvic Exam: Deferred       ASSESSMENT:  Encounter Diagnoses   Name Primary?     Visit for preventive health examination Yes     Encounter for gynecological examination without abnormal finding      Oral contraceptive pill surveillance         PLAN:     Orders Placed This Encounter   Medications     norgestimate-ethinyl estradiol (ORTHO-CYCLEN, 28,) 0.25-35 MG-MCG tablet     Sig: Take 1 tablet by mouth daily     Dispense:  84 tablet     Refill:  4     - Education provided regarding IUD; placement, side effects, risks, benefits  -Pamphlets regarding the Chhaya, Kyleena, and Mirena given.   -Patient can schedule IUD placement at anytime.  Continue taking birth control until day of placement.  Encouraged to take 600 mg ibuprofen within 1 hour of IUD insertion.   -Discussed continuing to take her current SUSAN even when if withdrawal bleed occurs, over time " may help prevent cyclic bleeding, but might experience more days of bleeding initially   -Renewed prescription for current Ortho-Cyclen for 1 year.   - Reviewed needed screenings for this visit: None    Additional teaching done at this visit regarding self breast exam, exercise, birth control, mental health and weight/diet.    Return to clinic in one year.  Follow-up as needed.    I, Tea Gotti, am serving as a scribe; to document services personally performed by  Zaida Diallo CNM based on data collection and the provider's statements to me.     PINO Stubbs    The encounter was performed by me and scribed by the SNM. The scribed note accurately reflects my personal services and decisions made by me.     ELLEN Anton CNM

## 2023-04-11 ENCOUNTER — OFFICE VISIT (OUTPATIENT)
Dept: OBGYN | Facility: CLINIC | Age: 28
End: 2023-04-11
Attending: MIDWIFE
Payer: COMMERCIAL

## 2023-04-11 VITALS
HEART RATE: 90 BPM | DIASTOLIC BLOOD PRESSURE: 83 MMHG | HEIGHT: 63 IN | SYSTOLIC BLOOD PRESSURE: 129 MMHG | WEIGHT: 174.8 LBS | BODY MASS INDEX: 30.97 KG/M2

## 2023-04-11 DIAGNOSIS — Z30.41 ORAL CONTRACEPTIVE PILL SURVEILLANCE: ICD-10-CM

## 2023-04-11 DIAGNOSIS — Z01.419 ENCOUNTER FOR GYNECOLOGICAL EXAMINATION WITHOUT ABNORMAL FINDING: ICD-10-CM

## 2023-04-11 DIAGNOSIS — Z00.00 VISIT FOR PREVENTIVE HEALTH EXAMINATION: Primary | ICD-10-CM

## 2023-04-11 PROCEDURE — 99213 OFFICE O/P EST LOW 20 MIN: CPT | Performed by: MIDWIFE

## 2023-04-11 PROCEDURE — 99385 PREV VISIT NEW AGE 18-39: CPT | Performed by: MIDWIFE

## 2023-04-11 RX ORDER — NORGESTIMATE AND ETHINYL ESTRADIOL 0.25-0.035
1 KIT ORAL DAILY
Qty: 84 TABLET | Refills: 4 | Status: SHIPPED | OUTPATIENT
Start: 2023-04-11 | End: 2024-06-10

## 2023-04-11 ASSESSMENT — ANXIETY QUESTIONNAIRES
GAD7 TOTAL SCORE: 5
1. FEELING NERVOUS, ANXIOUS, OR ON EDGE: SEVERAL DAYS
GAD7 TOTAL SCORE: 5
3. WORRYING TOO MUCH ABOUT DIFFERENT THINGS: SEVERAL DAYS
7. FEELING AFRAID AS IF SOMETHING AWFUL MIGHT HAPPEN: NOT AT ALL
2. NOT BEING ABLE TO STOP OR CONTROL WORRYING: SEVERAL DAYS
5. BEING SO RESTLESS THAT IT IS HARD TO SIT STILL: NOT AT ALL
6. BECOMING EASILY ANNOYED OR IRRITABLE: SEVERAL DAYS

## 2023-04-11 ASSESSMENT — PATIENT HEALTH QUESTIONNAIRE - PHQ9
SUM OF ALL RESPONSES TO PHQ QUESTIONS 1-9: 6
5. POOR APPETITE OR OVEREATING: SEVERAL DAYS

## 2023-04-11 NOTE — LETTER
"2023       RE: Selene Engle  [address]  [address]  [facility] [address]     Dear Colleague,    Thank you for referring your patient, Selene Engle, to the [facility] [place] [facility]. Please see a copy of my visit note below.    Progress Note    SUBJECTIVE:  Selene Engle is an 28 year old, , who requests an Annual Preventive Exam.     Concerns today include: Curious about different types of birth control including IUD.      Diet: feels like she eats balanced diet.  Fruits and vegetables every meal.     Exercise: Walk everyday.  Weight lift 2-3 times per week.     Sleep: \"sleeps ok\".  Sometime having trouble falling asleep.  Sleep hygiene, \"not good about putting her phone away\", started reading before bed on dark mode.     Stressors: Currently in grad school; getting a PHD in plant biology. 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 Along with planning a wedding. Seeing a therapist for weekly therapy.     Sexual history: Fiance; been together 5 years; male partner; monogamous relationship.                             Denies concerns for STI's.     Menstrual cycle:  Experiences cramping the first couple days.  Tries to skip her period by not taking the placebo pills she is still getting a period. When period begins she stopped taking her pills and allows for a withdrawal bleed.   Cycle length: 21-22 days   Lasts: 5-7 days    Denies HTN, blood clots, Migraine with aura, and breast cancer both personal and family history.    Menstrual History:      2019     9:37 AM 2022     9:30 AM 2023     2:00 PM   Menstrual History   LAST MENSTRUAL PERIOD  2022   Menarche Age 12 years     Period Cycle (Days) 5     Method of Contraception Combined oral contraceptive     Period Pattern Regular     Menstrual Flow Moderate     Menstrual Control Other (Comment)     Dysmenorrhea Moderate     PMS " Symptoms Cramping     Reviewed Today Yes     Comments diva cup         Last    Lab Results   Component Value Date    PAP NIL 2022     History of abnormal Pap smear: NO - age 21-29 PAP every 3 years recommended    Mammogram current: not applicable; will initiate screening at age 40  Last Mammogram: No past results    Last Colonoscopy:not applicable - will initiate at age 46  No results found for this or any previous visit.      HISTORY:  FLORENTINO 0.25-35 MG-MCG tablet, TAKE 1 TABLET BY MOUTH EVERY DAY    No current facility-administered medications on file prior to visit.    Allergies   Allergen Reactions    No Known Allergies      Immunization History   Administered Date(s) Administered    COVID-19 Vaccine 12+ (Pfizer) 2021, 2021, 2021    COVID-19 Vaccine Bivalent Booster 12+ (Pfizer) 10/19/2022    DTAP (<7y) 2001    DTP-Hib 1996, 1996, 1996, 1997    Flu, Unspecified 2021    HPV Quadrivalent 2008, 2008, 2008    HepA, Unspecified 2010, 2011    HepB, Unspecified 1996, 1996, 1996    Influenza Vaccine >6 months (Alfuria,Fluzone) 10/08/2020, 2021, 2022    MMR 1997, 2001    Meningococcal (Menomune ) 2009    Meningococcal,unspecified 2009    Polio, Unspecified  1996, 1996, 1996, 1997    Poliovirus, inactivated (IPV) 2001    TDAP (Adacel,Boostrix) 2009, 2022    Typhoid IM 2022    Typhoid Oral 2016    Varicella 1999, 2011    Yellow Fever 2016       OB History    Para Term  AB Living   0 0 0 0 0 0   SAB IAB Ectopic Multiple Live Births   0 0 0 0 0     History reviewed. No pertinent past medical history.  Past Surgical History:   Procedure Laterality Date    NO HISTORY OF SURGERY       Family History   Problem Relation Age of Onset    No Known Problems Mother     No Known Problems Father     Liver Disease  "Maternal Grandmother     No Known Problems Maternal Grandfather     No Known Problems Paternal Grandmother     Lupus Paternal Grandfather     No Known Problems Brother     No Known Problems Sister      Social History     Socioeconomic History    Marital status: Single   Tobacco Use    Smoking status: Never    Smokeless tobacco: Never   Substance and Sexual Activity    Alcohol use: Yes     Comment: light use on weekends    Drug use: Never    Sexual activity: Yes     Partners: Male     Birth control/protection: Condom, Pill       ROS   ROS: 10 point ROS neg other than the symptoms noted above in the HPI.        4/24/2019    10:26 PM 4/26/2019     9:37 AM 4/11/2023     2:42 PM   KYLEE-7 SCORE   Total Score 0 (minimal anxiety)     Total Score 0 0 5     EXAM:  Blood pressure 129/83, pulse 90, height 1.6 m (5' 3\"), weight 79.3 kg (174 lb 12.8 oz), last menstrual period 04/04/2023, not currently breastfeeding. Body mass index is 30.96 kg/m .  General - pleasant female in no acute distress.  Skin - no suspicious lesions or rashes  EENT-  PERRLA, euthyroid with out palpable nodules  Neck - supple without lymphadenopathy.  Lungs - clear to auscultation bilaterally.  Heart - regular rate and rhythm without murmur.  Musculoskeletal - no gross deformities.  Neurological - normal strength, sensation, and mental status.    Breast Exam:  Breast:Deferred     Pelvic Exam: Deferred       ASSESSMENT:  Encounter Diagnoses   Name Primary?    Visit for preventive health examination Yes    Encounter for gynecological examination without abnormal finding     Oral contraceptive pill surveillance         PLAN:     Orders Placed This Encounter   Medications    norgestimate-ethinyl estradiol (ORTHO-CYCLEN, 28,) 0.25-35 MG-MCG tablet     Sig: Take 1 tablet by mouth daily     Dispense:  84 tablet     Refill:  4     - Education provided regarding IUD; placement, side effects, risks, benefits  -Pamphlets regarding the Chhaya, Kyleena, and Mirena " given.   -Patient can schedule IUD placement at anytime.  Continue taking birth control until day of placement.  Encouraged to take 600 mg ibuprofen within 1 hour of IUD insertion.   -Discussed continuing to take her current SUSAN even when if withdrawal bleed occurs, over time may help prevent cyclic bleeding, but might experience more days of bleeding initially   -Renewed prescription for current Ortho-Cyclen for 1 year.   - Reviewed needed screenings for this visit: None    Additional teaching done at this visit regarding self breast exam, exercise, birth control, mental health and weight/diet.    Return to clinic in one year.  Follow-up as needed.    ITea, am serving as a scribe; to document services personally performed by  Zaida Diallo CNM based on data collection and the provider's statements to me.     PINO Stubbs    The encounter was performed by me and scribed by the SNM. The scribed note accurately reflects my personal services and decisions made by me.     ELLEN Anton CNM

## 2024-06-10 DIAGNOSIS — Z30.41 ORAL CONTRACEPTIVE PILL SURVEILLANCE: ICD-10-CM

## 2024-06-10 RX ORDER — NORGESTIMATE AND ETHINYL ESTRADIOL 0.25-0.035
1 KIT ORAL DAILY
Qty: 84 TABLET | Refills: 3 | Status: SHIPPED | OUTPATIENT
Start: 2024-06-10

## 2024-06-10 NOTE — TELEPHONE ENCOUNTER
Received request for Ortho-Cyclen. Pt last seen 4/11/23 See RN contraceptive protocol below.    For women meeting the following criteria, hormonal contraceptives may be refilled annually for up to 3 years:  -Age between 15-40? y  -Non-smoker? y  -Up-to-date on pap/HPV? Y, last done 1/20/22  -No major medical comorbidities? y  -No history of migraine with aura? Y    Refilled for the year per protocol.

## 2024-07-14 ENCOUNTER — HEALTH MAINTENANCE LETTER (OUTPATIENT)
Age: 29
End: 2024-07-14

## 2024-10-30 ENCOUNTER — NURSE TRIAGE (OUTPATIENT)
Dept: NURSING | Facility: CLINIC | Age: 29
End: 2024-10-30
Payer: COMMERCIAL

## 2024-10-31 NOTE — TELEPHONE ENCOUNTER
Nurse Triage SBAR    Is this a 2nd Level Triage? NO    Situation: diarrhea on abx    Background:  Patient reports starting amoxicillin on Monday for a sinus infection and has developed severe diarrhea today.  Notes that she has had 7-8 watery stools today. Denies abdominal pain or fever.  Reports she is well hydrated and is able to eat and drink normally.      Assessment: severe diarrhea 7-8 watery stools in last 24 hours, on abx X 3 days    Protocol Recommended Disposition:   SEE HCP (OR PCP TRIAGE) WITHIN 4 HOURS    Recommendation: Advised patient to be seen within 4 hours by HCP.  Reviewed concerning symptoms and when to call back.        Does the patient meet one of the following criteria for ADS visit consideration? No    Lucy Ren RN on 10/30/2024 at 8:42 PM    Reason for Disposition   SEVERE diarrhea (e.g., 7 or more times / day more than normal)    Additional Information   Negative: Shock suspected (e.g., cold/pale/clammy skin, too weak to stand, low BP, rapid pulse)   Negative: Difficult to awaken or acting confused (e.g., disoriented, slurred speech)   Negative: Sounds like a life-threatening emergency to the triager   Negative: Vomiting also present and worse than the diarrhea   Negative: SEVERE abdominal pain (e.g., excruciating)   Negative: [1] Blood in the stool AND [2] moderate or large amount of blood (e.g., any blood clots, passing blood without stool, toilet water turns red)   Negative: Black or tarry bowel movements  (Exception: Chronic-unchanged black-grey BMs AND is taking iron pills or Pepto-Bismol.)   Negative: [1] Drinking very little AND [2] dehydration suspected (e.g., no urine > 12 hours, very dry mouth, very lightheaded)   Negative: Patient sounds very sick or weak to the triager    Protocols used: Diarrhea on Antibiotics-A-

## 2025-01-12 ENCOUNTER — OFFICE VISIT (OUTPATIENT)
Dept: URGENT CARE | Facility: URGENT CARE | Age: 30
End: 2025-01-12
Payer: COMMERCIAL

## 2025-01-12 VITALS
DIASTOLIC BLOOD PRESSURE: 84 MMHG | TEMPERATURE: 97.9 F | SYSTOLIC BLOOD PRESSURE: 123 MMHG | HEART RATE: 62 BPM | RESPIRATION RATE: 14 BRPM | OXYGEN SATURATION: 96 %

## 2025-01-12 DIAGNOSIS — S61.211A LACERATION OF LEFT INDEX FINGER WITHOUT FOREIGN BODY WITHOUT DAMAGE TO NAIL, INITIAL ENCOUNTER: Primary | ICD-10-CM

## 2025-01-12 PROCEDURE — 12001 RPR S/N/AX/GEN/TRNK 2.5CM/<: CPT | Performed by: NURSE PRACTITIONER

## 2025-01-12 RX ORDER — FEXOFENADINE HCL 60 MG/1
60 TABLET, FILM COATED ORAL 2 TIMES DAILY
COMMUNITY

## 2025-01-13 NOTE — PATIENT INSTRUCTIONS
Keep skin clean and dry- shower and pat dry    Watch closely for worsening symptoms of infection including fever, chills, redness, swelling, pain, purulent discharge and follow-up right away if develops.

## 2025-01-13 NOTE — PROGRESS NOTES
Chief Complaint:     Chief Complaint   Patient presents with    Urgent Care    Laceration     Patient presents with a laceration to her left index finger from a kitchen knife.         HPI: Selene Engle is an 29 year old female that presents to clinic after cutting self on the left index finger with a kitchen knife about 30 minutes ago. She reports numbness of the finger but states she thinks it is due to pain. She denies dysfunction of the finger. Patient denies any loss of strength to the finger.  Patient is up to date on tetanus, last 2022.     Problem list, Medication list, Allergies, and Medical history reviewed in [facility] and updated as appropriate.    ROS:  Review of systems negative except for noted above      Vitals reviewed by Chiquita Moe NP  /84 (BP Location: Right arm)   Pulse 62   Temp 97.9  F (36.6  C) (Temporal)   Resp 14   SpO2 96%     Physical Exam:    Physical Exam  Constitutional:       General: She is not in acute distress.     Appearance: She is not toxic-appearing or diaphoretic.   Cardiovascular:      Pulses: Normal pulses.   Musculoskeletal:      Left hand: Tenderness present. No swelling. Normal range of motion. Normal sensation.   Skin:     General: Skin is warm.      Capillary Refill: Capillary refill takes less than 2 seconds.   Neurological:      Mental Status: She is alert.      Sensory: No sensory deficit.         Laceration size: 1 cm  Tendon function intact: yes  Sensation to light touch intact: yes  Pulses intact: yes    Medical Decision Making:  Laceration no evidence of neurovascular injury. The wound will be closed using sutures.     Assessment:     1. Laceration of left index finger without foreign body without damage to nail, initial encounter         Plan:    Patient declines further evaluation in emergency room for finger numbness.     Imaging of the injured area for foreign body or fracture was not indicated    Wound closed per procedure note below. Tetanus is  up to date    Wound was irrigated with sterile water and cleansed surgiscrub along with iodine swabs.  Vaseline ointment and sterile dressing applied in clinic along with finger splint since over joint     Discussed home wound care. Return to  with increased swelling, pain, redness, pus or fevers.  Follow up for Suture removal in 12 days.       Patient verbalized understanding and agreed with this plan. AVS reviewed with patient. Patient was discharged in stable condition.       Procedure Note - Wound Repair:     Anesthesia was obtained with 1% plain lidocaine via Local.  The wound, located on the left index finger, measured 1 cm and was clean wound edges, no foreign bodies.  The level of complexity was: simple .  The neurovascular exam was intact.  It was irrigated with sterile water and cleansed with surgiscrub and iodine swabs and explored.  The wound was closed using three 5-0 Ethylon interrupted. Patient became pale and dizzy during procedure. Applied ice and gave juice and water to drink and symptoms resolved. Patient discharged in stable condition.     Chiquita Moe NP 7:22 PM

## 2025-05-15 DIAGNOSIS — Z30.41 ORAL CONTRACEPTIVE PILL SURVEILLANCE: ICD-10-CM

## 2025-05-21 ENCOUNTER — MYC REFILL (OUTPATIENT)
Dept: OBGYN | Facility: CLINIC | Age: 30
End: 2025-05-21
Payer: COMMERCIAL

## 2025-05-21 DIAGNOSIS — Z30.41 ENCOUNTER FOR SURVEILLANCE OF CONTRACEPTIVE PILLS: ICD-10-CM

## 2025-05-21 RX ORDER — NORGESTIMATE AND ETHINYL ESTRADIOL 0.25-0.035
1 KIT ORAL DAILY
Qty: 84 TABLET | Refills: 3 | Status: SHIPPED | OUTPATIENT
Start: 2025-05-21

## 2025-05-21 RX ORDER — NORGESTIMATE AND ETHINYL ESTRADIOL 0.25-0.035
1 KIT ORAL DAILY
Qty: 28 TABLET | Refills: 26 | OUTPATIENT
Start: 2025-05-21

## 2025-05-21 NOTE — TELEPHONE ENCOUNTER
"Received request for Jaime Pt last seen in 2022.       Per visit note \"PLAN:           Orders Placed This Encounter   Medications    norgestimate-ethinyl estradiol (ORTHO-CYCLEN, 28,) 0.25-35 MG-MCG tablet       Sig: Take 1 tablet by mouth daily       Dispense:  84 tablet       Refill:  4      - Education provided regarding IUD; placement, side effects, risks, benefits  -Pamphlets regarding the Chhaya, Kyleena, and Mirena given.   -Patient can schedule IUD placement at anytime.  Continue taking birth control until day of placement.  Encouraged to take 600 mg ibuprofen within 1 hour of IUD insertion.   -Discussed continuing to take her current SUSAN even when if withdrawal bleed occurs, over time may help prevent cyclic bleeding, but might experience more days of bleeding initially   -Renewed prescription for current Ortho-Cyclen for 1 year.   - Reviewed needed screenings for this visit: None     Additional teaching done at this visit regarding self breast exam, exercise, birth control, mental health and weight/diet.     Return to clinic in one year.  Follow-up as needed.   \"     Per S RN protocol can fill birth control contraceptives up to 3 years from last visit date.       "

## 2025-07-19 ENCOUNTER — HEALTH MAINTENANCE LETTER (OUTPATIENT)
Age: 30
End: 2025-07-19